# Patient Record
Sex: MALE | Race: WHITE | Employment: FULL TIME | URBAN - METROPOLITAN AREA
[De-identification: names, ages, dates, MRNs, and addresses within clinical notes are randomized per-mention and may not be internally consistent; named-entity substitution may affect disease eponyms.]

---

## 2020-06-30 ENCOUNTER — MEDICAL CORRESPONDENCE (OUTPATIENT)
Dept: HEALTH INFORMATION MANAGEMENT | Facility: CLINIC | Age: 65
End: 2020-06-30

## 2020-07-10 ENCOUNTER — TRANSFERRED RECORDS (OUTPATIENT)
Dept: HEALTH INFORMATION MANAGEMENT | Facility: CLINIC | Age: 65
End: 2020-07-10

## 2020-07-10 LAB
ALBUMIN SERPL-MCNC: 4.4 G/DL
ALP SERPL-CCNC: 130 U/L
ALT SERPL-CCNC: 25 U/L
ANION GAP SERPL CALCULATED.3IONS-SCNC: ABNORMAL MMOL/L
AST SERPL-CCNC: 18 U/L
BILIRUB SERPL-MCNC: 1 MG/DL
BUN SERPL-MCNC: 17 MG/DL
CALCIUM SERPL-MCNC: 9.4 MG/DL
CHLORIDE SERPLBLD-SCNC: 103 MMOL/L
CHOLEST SERPL-MCNC: 208 MG/DL (ref 100–199)
CO2 SERPL-SCNC: 27 MMOL/L
CREAT SERPL-MCNC: 0.78 MG/DL
ERYTHROCYTE [DISTWIDTH] IN BLOOD BY AUTOMATED COUNT: 13.3 %
GFR SERPL CREATININE-BSD FRML MDRD: 95 ML/MIN/1.73M2
GLUCOSE SERPL-MCNC: 105 MG/DL (ref 65–99)
HBA1C MFR BLD: 5.6 % (ref 0–5.6)
HCT VFR BLD AUTO: 45.8 %
HDLC SERPL-MCNC: 45 MG/DL
HEMOGLOBIN: 15.3 G/DL (ref 13.5–18)
LDLC SERPL CALC-MCNC: 145 MG/DL (ref 0–99)
MCH RBC QN AUTO: 31.7 PG
MCHC RBC AUTO-ENTMCNC: 33.3 G/DL
MCV RBC AUTO: 95.2 FL
PLATELET # BLD AUTO: 288 10^9/L
POTASSIUM SERPL-SCNC: 4.6 MMOL/L
PROT SERPL-MCNC: 6.3 G/DL
RBC # BLD AUTO: 4.81 10^12/L
SODIUM SERPL-SCNC: 141 MMOL/L
TRIGL SERPL-MCNC: 89 MG/DL (ref 0–149)
VLDL CHOLESTEROL: 18 (ref 5–40)
WBC # BLD AUTO: 5.8 10^9/L

## 2020-07-16 ENCOUNTER — OFFICE VISIT (OUTPATIENT)
Dept: SURGERY | Facility: CLINIC | Age: 65
End: 2020-07-16
Payer: COMMERCIAL

## 2020-07-16 VITALS
BODY MASS INDEX: 28.44 KG/M2 | WEIGHT: 210 LBS | SYSTOLIC BLOOD PRESSURE: 126 MMHG | DIASTOLIC BLOOD PRESSURE: 70 MMHG | HEIGHT: 72 IN | HEART RATE: 58 BPM

## 2020-07-16 DIAGNOSIS — K40.90 RIGHT INGUINAL HERNIA: ICD-10-CM

## 2020-07-16 PROCEDURE — 99204 OFFICE O/P NEW MOD 45 MIN: CPT | Performed by: SURGERY

## 2020-07-16 ASSESSMENT — MIFFLIN-ST. JEOR: SCORE: 1780.55

## 2020-07-16 NOTE — PROGRESS NOTES
[facility]         Rasheed Bennett MD, MD    Bull Mariee MRN# [id number]   YOB: 1955 Age: 65 year old     PCP:  No primary care provider on file. None    Chief Complaint:  Right inguinal hernia    History of Present Illness:  Bull Mariee is a 65 year old male who presented with a an intermittent bulge near the right groin. The bulge comes and goes but has gotten larger over time. It is uncomfortable when the patient is engaging in exertional activity.  He is an avid cyclist.  Has not noticed any bulging on the left side or at the umbilicus.  The patient is here to discuss possible surgical repair of the right inguinal hernia.    PMH:  Bull Mariee  has no past medical history on file.  PSH:  Bull Mariee  has no past surgical history on file.    Home medications and allergies reviewed.    Social History:  Bull Mariee  reports that he has never smoked. He has never used smokeless tobacco. He reports current alcohol use. He reports that he does not use drugs.  Family History:  Bull Mariee family history includes Chronic Obstructive Pulmonary Disease in his father; Colon Polyps in his mother; Emphysema in his father.    ROS:  The 10 point Review of Systems is negative other than noted in the HPI.    Physical Exam:  Blood pressure 126/70, pulse 58, height 1.829 m (6'), weight 95.3 kg (210 lb).  210 lbs 0 oz  Healthy gentleman in no distress.  Converses normally, affect unremarkable  Pupils equal round and reactive to light.  Moist mucous membranes, tongue midline   No cervical lymphadenopathy or thyromegaly.   Lung fields clear, breathing comfortably.   Heart normal sinus rhythm.  No murmurs rubs or gallops.  No obvious neurological deficits  Abdomen soft, nontender, nondistended.  Obvious bulge on the right.  No evidence of hernia on the left or at the umbilicus       Assessment/plan:  Pleasant healthy young male with what appears to be an inguinal  hernia. I explained that these are usually not a cause for small bowel incarceration or obstruction, but do become larger over time. They can certainly be uncomfortable and repair is warranted. I recommended a laparoscopic right, possible bilateral inguinal hernia repair with mesh. This would normally be done under a general anesthetic. Risks of surgery were explained to the patient, including hernia recurrence, wound infection, or mesh removal.  Patient was going to notify the office when they were ready to schedule surgery.    Jack Bennett M.D.  [facility]  [phone]    Please route or send letter to:  Primary Care Provider (PCP) and Referring Provider

## 2020-07-16 NOTE — LETTER
[facility]    [address], [address]  [place], [place] [address]  Phone [phone]  Fax [phone]    [address], [address]  [facility] [place] [address]  Phone [phone]  Fax [phone]    [email or web address]     July 16, 2020      [facility]         Rasheed Bennett MD, MD     Bull Mariee MRN# [id number]   YOB: 1955 Age: 65 year old      PCP:  No primary care provider on file. None     Chief Complaint:  Right inguinal hernia     History of Present Illness:  Bull Mariee is a 65 year old male who presented with a an intermittent bulge near the right groin. The bulge comes and goes but has gotten larger over time. It is uncomfortable when the patient is engaging in exertional activity.  He is an avid cyclist.  Has not noticed any bulging on the left side or at the umbilicus.  The patient is here to discuss possible surgical repair of the right inguinal hernia.     PMH:  Bull Mariee  has no past medical history on file.  PSH:  Bull Mariee  has no past surgical history on file.     Home medications and allergies reviewed.     Social History:  Bull Mariee  reports that he has never smoked. He has never used smokeless tobacco. He reports current alcohol use. He reports that he does not use drugs.  Family History:  Bull Mariee family history includes Chronic Obstructive Pulmonary Disease in his father; Colon Polyps in his mother; Emphysema in his father.     ROS:  The 10 point Review of Systems is negative other than noted in the HPI.     Physical Exam:  Blood pressure 126/70, pulse 58, height 1.829 m (6'), weight 95.3 kg (210 lb).  210 lbs 0 oz  Healthy gentleman in no distress.  Converses normally, affect unremarkable  Pupils equal round and reactive to light.  Moist mucous membranes, tongue midline   No cervical lymphadenopathy or thyromegaly.   Lung fields  clear, breathing comfortably.   Heart normal sinus rhythm.  No murmurs rubs or gallops.  No obvious neurological deficits  Abdomen soft, nontender, nondistended.  Obvious bulge on the right.  No evidence of hernia on the left or at the umbilicus         Assessment/plan:  Pleasant healthy young male with what appears to be an inguinal hernia. I explained that these are usually not a cause for small bowel incarceration or obstruction, but do become larger over time. They can certainly be uncomfortable and repair is warranted. I recommended a laparoscopic right, possible bilateral inguinal hernia repair with mesh. This would normally be done under a general anesthetic. Risks of surgery were explained to the patient, including hernia recurrence, wound infection, or mesh removal.  Patient was going to notify the office when they were ready to schedule surgery.     Jack Bennett M.D.  [facility]  [phone]

## 2020-07-20 ENCOUNTER — PREP FOR PROCEDURE (OUTPATIENT)
Dept: SURGERY | Facility: CLINIC | Age: 65
End: 2020-07-20

## 2020-07-20 DIAGNOSIS — K40.90 RIGHT INGUINAL HERNIA: Primary | ICD-10-CM

## 2020-07-20 DIAGNOSIS — Z11.59 ENCOUNTER FOR SCREENING FOR OTHER VIRAL DISEASES: Primary | ICD-10-CM

## 2020-07-22 ENCOUNTER — HOSPITAL ENCOUNTER (OUTPATIENT)
Dept: CARDIOLOGY | Facility: CLINIC | Age: 65
Discharge: HOME | End: 2020-07-22
Attending: PHYSICIAN ASSISTANT | Admitting: PHYSICIAN ASSISTANT
Payer: COMMERCIAL

## 2020-07-22 ENCOUNTER — TELEPHONE (OUTPATIENT)
Dept: SURGERY | Facility: CLINIC | Age: 65
End: 2020-07-22

## 2020-07-22 DIAGNOSIS — E78.5 HYPERLIPIDEMIA: ICD-10-CM

## 2020-07-22 PROCEDURE — 75571 CT HRT W/O DYE W/CA TEST: CPT | Mod: 26 | Performed by: INTERNAL MEDICINE

## 2020-07-22 PROCEDURE — 75571 CT HRT W/O DYE W/CA TEST: CPT

## 2020-07-22 NOTE — TELEPHONE ENCOUNTER
Type of surgery: Laparoscopic right inguinal hernia repair, possible bilateral repair  Location of surgery: [facility]  Date and time of surgery: 8/10/20 at 10am  Surgeon: Dr. Rasheed Bennett  Pre-Op Appt Date: Patient to schedule  Post-Op Appt Date: Patient to schedule   Packet sent out: Yes  Pre-cert/Authorization completed:  Not Applicable  Date: 7/22/20

## 2020-07-31 ENCOUNTER — TELEPHONE (OUTPATIENT)
Dept: SURGERY | Facility: CLINIC | Age: 65
End: 2020-07-31

## 2020-07-31 NOTE — TELEPHONE ENCOUNTER
"Name of caller: Patient    Reason for Call:  Scheduled for [facility] on 8/10 and would like to get information on the mesh Dr Bennett uses for the hernia surgery. \"Just doing my homework\". What precautions or potential risks accompany the product. Any recalls?Can I get a couple referrals to patients that have had a similar procedure with the use of the mesh.    Surgeon:  Dr. Bennett     Recent Surgery:  No    If yes, when & what type:  N/A      Best phone number to reach pt at is: [phone]  Ok to leave a message with medical info? Yes.    Pharmacy preferred (if calling for a refill): N/A    "

## 2020-08-06 DIAGNOSIS — Z11.59 ENCOUNTER FOR SCREENING FOR OTHER VIRAL DISEASES: ICD-10-CM

## 2020-08-06 PROCEDURE — U0003 INFECTIOUS AGENT DETECTION BY NUCLEIC ACID (DNA OR RNA); SEVERE ACUTE RESPIRATORY SYNDROME CORONAVIRUS 2 (SARS-COV-2) (CORONAVIRUS DISEASE [COVID-19]), AMPLIFIED PROBE TECHNIQUE, MAKING USE OF HIGH THROUGHPUT TECHNOLOGIES AS DESCRIBED BY CMS-2020-01-R: HCPCS | Performed by: SURGERY

## 2020-08-07 LAB
SARS-COV-2 RNA SPEC QL NAA+PROBE: NOT DETECTED
SPECIMEN SOURCE: NORMAL

## 2020-08-10 ENCOUNTER — ANESTHESIA (OUTPATIENT)
Dept: SURGERY | Facility: CLINIC | Age: 65
End: 2020-08-10
Payer: COMMERCIAL

## 2020-08-10 ENCOUNTER — ANESTHESIA EVENT (OUTPATIENT)
Dept: SURGERY | Facility: CLINIC | Age: 65
End: 2020-08-10
Payer: COMMERCIAL

## 2020-08-10 ENCOUNTER — HOSPITAL ENCOUNTER (OUTPATIENT)
Facility: CLINIC | Age: 65
Discharge: HOME | End: 2020-08-10
Attending: SURGERY | Admitting: SURGERY
Payer: COMMERCIAL

## 2020-08-10 ENCOUNTER — APPOINTMENT (OUTPATIENT)
Dept: SURGERY | Facility: PHYSICIAN GROUP | Age: 65
End: 2020-08-10
Payer: COMMERCIAL

## 2020-08-10 VITALS
OXYGEN SATURATION: 95 % | BODY MASS INDEX: 29.71 KG/M2 | HEIGHT: 71 IN | HEART RATE: 70 BPM | SYSTOLIC BLOOD PRESSURE: 113 MMHG | RESPIRATION RATE: 14 BRPM | TEMPERATURE: 96.6 F | DIASTOLIC BLOOD PRESSURE: 79 MMHG | WEIGHT: 212.2 LBS

## 2020-08-10 DIAGNOSIS — K40.90 RIGHT INGUINAL HERNIA: ICD-10-CM

## 2020-08-10 PROCEDURE — C1727 CATH, BAL TIS DIS, NON-VAS: HCPCS | Performed by: SURGERY

## 2020-08-10 PROCEDURE — 40000169 ZZH STATISTIC PRE-PROCEDURE ASSESSMENT I: Performed by: SURGERY

## 2020-08-10 PROCEDURE — C1781 MESH (IMPLANTABLE): HCPCS | Performed by: SURGERY

## 2020-08-10 PROCEDURE — 37000009 ZZH ANESTHESIA TECHNICAL FEE, EACH ADDTL 15 MIN: Performed by: SURGERY

## 2020-08-10 PROCEDURE — 25000132 ZZH RX MED GY IP 250 OP 250 PS 637

## 2020-08-10 PROCEDURE — 25000125 ZZHC RX 250: Performed by: NURSE ANESTHETIST, CERTIFIED REGISTERED

## 2020-08-10 PROCEDURE — 25800030 ZZH RX IP 258 OP 636: Performed by: NURSE ANESTHETIST, CERTIFIED REGISTERED

## 2020-08-10 PROCEDURE — 25000566 ZZH SEVOFLURANE, EA 15 MIN: Performed by: SURGERY

## 2020-08-10 PROCEDURE — 71000012 ZZH RECOVERY PHASE 1 LEVEL 1 FIRST HR: Performed by: SURGERY

## 2020-08-10 PROCEDURE — 36000058 ZZH SURGERY LEVEL 3 EA 15 ADDTL MIN: Performed by: SURGERY

## 2020-08-10 PROCEDURE — 25000128 H RX IP 250 OP 636: Performed by: NURSE ANESTHETIST, CERTIFIED REGISTERED

## 2020-08-10 PROCEDURE — 37000008 ZZH ANESTHESIA TECHNICAL FEE, 1ST 30 MIN: Performed by: SURGERY

## 2020-08-10 PROCEDURE — 25000125 ZZHC RX 250: Performed by: SURGERY

## 2020-08-10 PROCEDURE — 25000128 H RX IP 250 OP 636: Performed by: SURGERY

## 2020-08-10 PROCEDURE — 27210794 ZZH OR GENERAL SUPPLY STERILE: Performed by: SURGERY

## 2020-08-10 PROCEDURE — 71000027 ZZH RECOVERY PHASE 2 EACH 15 MINS: Performed by: SURGERY

## 2020-08-10 PROCEDURE — 36000056 ZZH SURGERY LEVEL 3 1ST 30 MIN: Performed by: SURGERY

## 2020-08-10 PROCEDURE — 71000013 ZZH RECOVERY PHASE 1 LEVEL 1 EA ADDTL HR: Performed by: SURGERY

## 2020-08-10 DEVICE — MESH PROGRIP LAPAROSCOPIC 5.9X3.9" PARIETEX SELF-FIX LPG1510: Type: IMPLANTABLE DEVICE | Site: GROIN | Status: FUNCTIONAL

## 2020-08-10 RX ORDER — CEFAZOLIN SODIUM 1 G/3ML
1 INJECTION, POWDER, FOR SOLUTION INTRAMUSCULAR; INTRAVENOUS SEE ADMIN INSTRUCTIONS
Status: DISCONTINUED | OUTPATIENT
Start: 2020-08-10 | End: 2020-08-10 | Stop reason: HOSPADM

## 2020-08-10 RX ORDER — FENTANYL CITRATE 50 UG/ML
INJECTION, SOLUTION INTRAMUSCULAR; INTRAVENOUS PRN
Status: DISCONTINUED | OUTPATIENT
Start: 2020-08-10 | End: 2020-08-10

## 2020-08-10 RX ORDER — SODIUM CHLORIDE, SODIUM LACTATE, POTASSIUM CHLORIDE, CALCIUM CHLORIDE 600; 310; 30; 20 MG/100ML; MG/100ML; MG/100ML; MG/100ML
INJECTION, SOLUTION INTRAVENOUS CONTINUOUS
Status: DISCONTINUED | OUTPATIENT
Start: 2020-08-10 | End: 2020-08-10 | Stop reason: HOSPADM

## 2020-08-10 RX ORDER — ONDANSETRON 4 MG/1
4 TABLET, ORALLY DISINTEGRATING ORAL EVERY 30 MIN PRN
Status: DISCONTINUED | OUTPATIENT
Start: 2020-08-10 | End: 2020-08-10 | Stop reason: HOSPADM

## 2020-08-10 RX ORDER — NALOXONE HYDROCHLORIDE 0.4 MG/ML
.1-.4 INJECTION, SOLUTION INTRAMUSCULAR; INTRAVENOUS; SUBCUTANEOUS
Status: DISCONTINUED | OUTPATIENT
Start: 2020-08-10 | End: 2020-08-10 | Stop reason: HOSPADM

## 2020-08-10 RX ORDER — LIDOCAINE HYDROCHLORIDE 20 MG/ML
INJECTION, SOLUTION INFILTRATION; PERINEURAL PRN
Status: DISCONTINUED | OUTPATIENT
Start: 2020-08-10 | End: 2020-08-10

## 2020-08-10 RX ORDER — ONDANSETRON 2 MG/ML
4 INJECTION INTRAMUSCULAR; INTRAVENOUS EVERY 30 MIN PRN
Status: DISCONTINUED | OUTPATIENT
Start: 2020-08-10 | End: 2020-08-10 | Stop reason: HOSPADM

## 2020-08-10 RX ORDER — BUPIVACAINE HYDROCHLORIDE AND EPINEPHRINE 5; 5 MG/ML; UG/ML
INJECTION, SOLUTION EPIDURAL; INTRACAUDAL; PERINEURAL
Status: DISCONTINUED
Start: 2020-08-10 | End: 2020-08-10 | Stop reason: HOSPADM

## 2020-08-10 RX ORDER — LIDOCAINE HYDROCHLORIDE 10 MG/ML
INJECTION, SOLUTION EPIDURAL; INFILTRATION; INTRACAUDAL; PERINEURAL
Status: DISCONTINUED
Start: 2020-08-10 | End: 2020-08-10 | Stop reason: HOSPADM

## 2020-08-10 RX ORDER — HYDROCODONE BITARTRATE AND ACETAMINOPHEN 5; 325 MG/1; MG/1
2 TABLET ORAL
Status: COMPLETED | OUTPATIENT
Start: 2020-08-10 | End: 2020-08-10

## 2020-08-10 RX ORDER — HYDROMORPHONE HYDROCHLORIDE 1 MG/ML
.3-.5 INJECTION, SOLUTION INTRAMUSCULAR; INTRAVENOUS; SUBCUTANEOUS EVERY 5 MIN PRN
Status: DISCONTINUED | OUTPATIENT
Start: 2020-08-10 | End: 2020-08-10 | Stop reason: HOSPADM

## 2020-08-10 RX ORDER — PROPOFOL 10 MG/ML
INJECTION, EMULSION INTRAVENOUS PRN
Status: DISCONTINUED | OUTPATIENT
Start: 2020-08-10 | End: 2020-08-10

## 2020-08-10 RX ORDER — EPHEDRINE SULFATE 50 MG/ML
INJECTION, SOLUTION INTRAMUSCULAR; INTRAVENOUS; SUBCUTANEOUS PRN
Status: DISCONTINUED | OUTPATIENT
Start: 2020-08-10 | End: 2020-08-10

## 2020-08-10 RX ORDER — NEOSTIGMINE METHYLSULFATE 1 MG/ML
VIAL (ML) INJECTION PRN
Status: DISCONTINUED | OUTPATIENT
Start: 2020-08-10 | End: 2020-08-10

## 2020-08-10 RX ORDER — ONDANSETRON 2 MG/ML
INJECTION INTRAMUSCULAR; INTRAVENOUS PRN
Status: DISCONTINUED | OUTPATIENT
Start: 2020-08-10 | End: 2020-08-10

## 2020-08-10 RX ORDER — CEFAZOLIN SODIUM 2 G/100ML
2 INJECTION, SOLUTION INTRAVENOUS
Status: COMPLETED | OUTPATIENT
Start: 2020-08-10 | End: 2020-08-10

## 2020-08-10 RX ORDER — OXYCODONE AND ACETAMINOPHEN 5; 325 MG/1; MG/1
1-2 TABLET ORAL EVERY 4 HOURS PRN
Qty: 12 TABLET | Refills: 0 | Status: SHIPPED | OUTPATIENT
Start: 2020-08-10 | End: 2020-09-23

## 2020-08-10 RX ORDER — GLYCOPYRROLATE 0.2 MG/ML
INJECTION, SOLUTION INTRAMUSCULAR; INTRAVENOUS PRN
Status: DISCONTINUED | OUTPATIENT
Start: 2020-08-10 | End: 2020-08-10

## 2020-08-10 RX ORDER — DEXAMETHASONE SODIUM PHOSPHATE 4 MG/ML
INJECTION, SOLUTION INTRA-ARTICULAR; INTRALESIONAL; INTRAMUSCULAR; INTRAVENOUS; SOFT TISSUE PRN
Status: DISCONTINUED | OUTPATIENT
Start: 2020-08-10 | End: 2020-08-10

## 2020-08-10 RX ORDER — FENTANYL CITRATE 0.05 MG/ML
25-50 INJECTION, SOLUTION INTRAMUSCULAR; INTRAVENOUS
Status: DISCONTINUED | OUTPATIENT
Start: 2020-08-10 | End: 2020-08-10 | Stop reason: HOSPADM

## 2020-08-10 RX ORDER — AMOXICILLIN 250 MG
1-2 CAPSULE ORAL 2 TIMES DAILY
Qty: 30 TABLET | Refills: 0 | Status: SHIPPED | OUTPATIENT
Start: 2020-08-10 | End: 2020-09-23

## 2020-08-10 RX ORDER — SODIUM CHLORIDE, SODIUM LACTATE, POTASSIUM CHLORIDE, CALCIUM CHLORIDE 600; 310; 30; 20 MG/100ML; MG/100ML; MG/100ML; MG/100ML
INJECTION, SOLUTION INTRAVENOUS CONTINUOUS PRN
Status: DISCONTINUED | OUTPATIENT
Start: 2020-08-10 | End: 2020-08-10

## 2020-08-10 RX ORDER — KETOROLAC TROMETHAMINE 30 MG/ML
INJECTION, SOLUTION INTRAMUSCULAR; INTRAVENOUS PRN
Status: DISCONTINUED | OUTPATIENT
Start: 2020-08-10 | End: 2020-08-10

## 2020-08-10 RX ADMIN — GLYCOPYRROLATE 0.8 MG: 0.2 INJECTION, SOLUTION INTRAMUSCULAR; INTRAVENOUS at 11:15

## 2020-08-10 RX ADMIN — GLYCOPYRROLATE 0.2 MG: 0.2 INJECTION, SOLUTION INTRAMUSCULAR; INTRAVENOUS at 10:37

## 2020-08-10 RX ADMIN — LIDOCAINE HYDROCHLORIDE 60 MG: 20 INJECTION, SOLUTION INFILTRATION; PERINEURAL at 10:16

## 2020-08-10 RX ADMIN — PROPOFOL 200 MG: 10 INJECTION, EMULSION INTRAVENOUS at 10:16

## 2020-08-10 RX ADMIN — KETOROLAC TROMETHAMINE 30 MG: 30 INJECTION, SOLUTION INTRAMUSCULAR at 11:23

## 2020-08-10 RX ADMIN — NEOSTIGMINE METHYLSULFATE 5 MG: 1 INJECTION, SOLUTION INTRAVENOUS at 11:15

## 2020-08-10 RX ADMIN — DEXMEDETOMIDINE HYDROCHLORIDE 8 MCG: 100 INJECTION, SOLUTION INTRAVENOUS at 11:04

## 2020-08-10 RX ADMIN — PHENYLEPHRINE HYDROCHLORIDE 100 MCG: 10 INJECTION INTRAVENOUS at 10:20

## 2020-08-10 RX ADMIN — DEXMEDETOMIDINE HYDROCHLORIDE 12 MCG: 100 INJECTION, SOLUTION INTRAVENOUS at 11:15

## 2020-08-10 RX ADMIN — HYDROMORPHONE HYDROCHLORIDE 0.5 MG: 1 INJECTION, SOLUTION INTRAMUSCULAR; INTRAVENOUS; SUBCUTANEOUS at 10:42

## 2020-08-10 RX ADMIN — FENTANYL CITRATE 100 MCG: 50 INJECTION, SOLUTION INTRAMUSCULAR; INTRAVENOUS at 10:16

## 2020-08-10 RX ADMIN — DEXMEDETOMIDINE HYDROCHLORIDE 8 MCG: 100 INJECTION, SOLUTION INTRAVENOUS at 10:41

## 2020-08-10 RX ADMIN — Medication 10 MG: at 10:20

## 2020-08-10 RX ADMIN — DEXMEDETOMIDINE HYDROCHLORIDE 12 MCG: 100 INJECTION, SOLUTION INTRAVENOUS at 10:48

## 2020-08-10 RX ADMIN — DEXAMETHASONE SODIUM PHOSPHATE 4 MG: 4 INJECTION, SOLUTION INTRA-ARTICULAR; INTRALESIONAL; INTRAMUSCULAR; INTRAVENOUS; SOFT TISSUE at 10:26

## 2020-08-10 RX ADMIN — MIDAZOLAM 2 MG: 1 INJECTION INTRAMUSCULAR; INTRAVENOUS at 10:08

## 2020-08-10 RX ADMIN — SODIUM CHLORIDE, POTASSIUM CHLORIDE, SODIUM LACTATE AND CALCIUM CHLORIDE: 600; 310; 30; 20 INJECTION, SOLUTION INTRAVENOUS at 10:08

## 2020-08-10 RX ADMIN — Medication 10 MG: at 10:58

## 2020-08-10 RX ADMIN — CEFAZOLIN SODIUM 2 G: 2 INJECTION, SOLUTION INTRAVENOUS at 10:14

## 2020-08-10 RX ADMIN — HYDROCODONE BITARTRATE AND ACETAMINOPHEN 1 TABLET: 5; 325 TABLET ORAL at 12:22

## 2020-08-10 RX ADMIN — FENTANYL CITRATE 25 MCG: 50 INJECTION, SOLUTION INTRAMUSCULAR; INTRAVENOUS at 11:17

## 2020-08-10 RX ADMIN — ONDANSETRON 4 MG: 2 INJECTION INTRAMUSCULAR; INTRAVENOUS at 11:26

## 2020-08-10 RX ADMIN — ROCURONIUM BROMIDE 50 MG: 10 INJECTION INTRAVENOUS at 10:16

## 2020-08-10 ASSESSMENT — MIFFLIN-ST. JEOR: SCORE: 1774.66

## 2020-08-10 NOTE — DISCHARGE INSTRUCTIONS
Same Day Surgery Discharge Instructions for  Sedation and General Anesthesia       It's not unusual to feel dizzy, light-headed or faint for up to 24 hours after surgery or while taking pain medication.  If you have these symptoms: sit for a few minutes before standing and have someone assist you when you get up to walk or use the bathroom.      You should rest and relax for the next 24 hours. We recommend you make arrangements to have an adult stay with you for at least 24 hours after your discharge.  Avoid hazardous and strenuous activity.      DO NOT DRIVE any vehicle or operate mechanical equipment for 24 hours following the end of your surgery.  Even though you may feel normal, your reactions may be affected by the medication you have received.      Do not drink alcoholic beverages for 24 hours following surgery.       Slowly progress to your regular diet as you feel able. It's not unusual to feel nauseated and/or vomit after receiving anesthesia.  If you develop these symptoms, drink clear liquids (apple juice, ginger ale, broth, 7-up, etc. ) until you feel better.  If your nausea and vomiting persists for 24 hours, please notify your surgeon.        All narcotic pain medications, along with inactivity and anesthesia, can cause constipation. Drinking plenty of liquids and increasing fiber intake will help.      For any questions of a medical nature, call your surgeon.      Do not make important decisions for 24 hours.      If you had general anesthesia, you may have a sore throat for a couple of days related to the breathing tube used during surgery.  You may use Cepacol lozenges to help with this discomfort.  If it worsens or if you develop a fever, contact your surgeon.       If you feel your pain is not well managed with the pain medications prescribed by your surgeon, please contact your surgeon's office to let them know so they can address your concerns.       CoVid 19 Information    We want to give you  information regarding Covid. Please consult your primary care provider with any questions you might have.     Patient who have symptoms (cough, fever, or shortness of breath), need to isolate for 7 days from when symptoms started OR 72 hours after fever resolves (without fever reducing medications) AND improvement of respiratory symptoms (whichever is longer).      Isolate yourself at home (in own room/own bathroom if possible)    Do Not allow any visitors    Do Not go to work or school    Do Not go to [employer],  centers, shopping, or other public places.    Do Not shake hands.    Avoid close and intimate contact with others (hugging, kissing).    Follow CDC recommendations for household cleaning of frequently touched services.     After the initial 7 days, continue to isolate yourself from household members as much as possible. To continue decrease the risk of community spread and exposure, you and any members of your household should limit activities in public for 14 days after starting home isolation.     You can reference the following CDC link for helpful home isolation/care tips:  https://www.cdc.gov/coronavirus/2019-ncov/downloads/10Things.pdf    Protect Others:    Cover Your Mouth and Nose with a mask, disposable tissue or wash cloth to avoid spreading germs to others.    Wash your hands and face frequently with soap and water    Call Your Primary Doctor If: Breathing difficulty develops or you become worse.    For more information about COVID19 and options for caring for yourself at home, please visit the CDC website at https://www.cdc.gov/coronavirus/2019-ncov/about/steps-when-sick.html  For more options for care at [facility], please visit our website at [email or web address]          Today you received Toradol, an antiinflammatory medication similar to Ibuprofen.  You should not take other antiinflammatory medication, such as Ibuprofen, Motrin, Advil, Aleve,  Naprosyn, etc until 5:30 p.m.           [facility]  Discharge Instructions: Post-Operative Laparoscopic Inguinal Hernia    ACTIVITY    Take frequent, short walks and increase your activity gradually.      Avoid strenuous physical activity or heavy lifting greater than 15lbs. for 3-4 weeks.  You may climb stairs.    You may drive without restrictions when you are not using any prescription pain medication and comfortable in a car.    You may return to work/school when you are comfortable without any prescription pain medication.    WOUND CARE    You may remove your outer dressing or Band-Aids and shower 48 hours after the surgery.  Pat your incisions dry and leave open to air.  Re-apply dressing (Band-aid or gauze/tape) as needed for comfort or drainage.    You may have steri-strips (looks like white tape) on your incision.  You may peel off the steri-strip 2 weeks after surgery if they have not peeled off on their own.    Do not soak your incisions in a tub or pool for 2 weeks.     Do not apply any lotions, creams, or ointments to your incisions.    A ridge under incisions is normal and will gradually resolve.    DIET    Start with liquids, then gradually resume your regular diet as tolerated.     Drink plenty of liquids to stay hydrated.     PAIN    Expect some tenderness and discomfort at the incision sites.  Use the prescribed pain medication at your discretion.  Expect gradual resolution of your pain over several days.    You may take ibuprofen with food (unless you have been told not to) instead of or in addition to your prescribed pain medication.  If you are taking Norco or Percocet, do not take any additional acetaminophen/APAP/Tylenol.    Do not drink alcohol or drive while you are taking pain medications.    You may apply ice to your incisions in 20 minute intervals as needed for the next 48 hours.  After that time, consider switching to heat if you  prefer.    EXPECTATIONS    *For our male patients, you may notice air in your scrotum and/or penis after the surgery.  This is due to the gas used during surgery.  You can expect some swelling and bruising involving the scrotum and/or penis as well as numbness.  These symptoms are expected and should gradually resolve in the next few days. You may use ice to help with the swelling and- try placing a rolled hand towel below your scrotum to help alleviate scrotal discomfort.  If you develop significant testicular or penile pain, please call our office and speak with a nurse.     Pain medications can cause constipation.  Limit use when possible.  Take over the counter stool softener/stimulant, such as Colace or Senna, 1-2 times a day with plenty of water.  You may take a mild over the counter laxative, such as Miralax or a suppository, as needed.  You may discontinue these medications once you are having regular bowel movements and/or are no longer taking your narcotic pain medication.      You may have shoulder or upper back discomfort due to the gas used in surgery.  This is temporary and should resolve in 48-72 hours.  Short, frequent walks may help with this.      FOLLOW UP    Our office will contact you approximately 2 weeks to check on your progress and answer any questions you may have.  If you are doing well, you will not need to return for a follow up appointment.  If any concerns are identified over the phone, we will help you make an appointment to see a provider.    If you have not received a phone call, have any questions or concerns, or would like to be seen, please call us at [phone] and ask to speak with our nurse.  We are located at [address] [place] [address].    CALL OUR OFFICE IF YOU HAVE:     Chills or fever above 101.5 F.    Increased redness or drainage at your incisions.    Significant bleeding.    Pain not relieved by your pain medication or rest.    Increasing pain  after the first 48 hours.    Any other concerns or questions.      Revised January 2018

## 2020-08-10 NOTE — ANESTHESIA POSTPROCEDURE EVALUATION
Patient: Bull Mariee    Procedure(s):  LAPAROSCOPIC RIGHT INGUINAL HERNIA REPAIR,    Diagnosis:Right inguinal hernia [K40.90]  Diagnosis Additional Information: No value filed.    Anesthesia Type:  General    Note:  Anesthesia Post Evaluation    Patient location during evaluation: bedside  Patient participation: Able to fully participate in evaluation  Level of consciousness: awake and alert  Pain management: adequate  Airway patency: patent  Cardiovascular status: acceptable  Respiratory status: acceptable  Hydration status: acceptable  PONV: none and controlled     Anesthetic complications: None          Last vitals:  Vitals:    08/10/20 1215 08/10/20 1230 08/10/20 1330   BP: 122/75 128/84 113/79   Pulse:  70    Resp: 14 12 14   Temp:      SpO2: 95% 92% 95%         Electronically Signed By: Howie Crain MD  August 10, 2020  4:50 PM

## 2020-08-10 NOTE — ANESTHESIA PREPROCEDURE EVALUATION
Procedure: Procedure(s):  LAPAROSCOPIC RIGHT INGUINAL HERNIA REPAIR, POSSIBLE BILATERAL  Preop diagnosis: Right inguinal hernia [K40.90]    No Known Allergies  Past Medical History:   Diagnosis Date     Dyslipidemia      ED (erectile dysfunction)      Impaired fasting glucose      Inguinal hernia      Past Surgical History:   Procedure Laterality Date     GENITOURINARY SURGERY      Vasectomy     Social History     Tobacco Use     Smoking status: Never Smoker     Smokeless tobacco: Never Used   Substance Use Topics     Alcohol use: Yes     Comment: 4 per week      Prior to Admission medications    Not on File     Current Facility-Administered Medications Ordered in Epic   Medication Dose Route Frequency Last Rate Last Dose     ceFAZolin (ANCEF) 1 g vial to attach to  ml bag for ADULT or 50 ml bag for PEDS  1 g Intravenous See Admin Instructions         ceFAZolin (ANCEF) intermittent infusion 2 g in 100 mL dextrose PRE-MIX  2 g Intravenous Pre-Op/Pre-procedure x 1 dose         No current [facility]-ordered outpatient medications on file.       Wt Readings from Last 1 Encounters:   08/10/20 96.3 kg (212 lb 3.2 oz)     Temp Readings from Last 1 Encounters:   08/10/20 36.1  C (97  F) (Oral)     BP Readings from Last 6 Encounters:   08/10/20 (!) 148/78   07/16/20 126/70     Pulse Readings from Last 4 Encounters:   08/10/20 58   07/16/20 58     Resp Readings from Last 1 Encounters:   08/10/20 18     SpO2 Readings from Last 1 Encounters:   08/10/20 96%     No results for input(s): NA, POTASSIUM, CHLORIDE, CO2, ANIONGAP, GLC, BUN, CR, TAISHA in the last 90683 hours.  No results for input(s): AST, ALT, ALKPHOS, BILITOTAL, LIPASE in the last 02369 hours.  No results for input(s): WBC, HGB, PLT in the last 22186 hours.  No results for input(s): ABO, RH in the last 96255 hours.  No results for input(s): INR, PTT in the last 78800 hours.   No results for input(s): TROPI in the last 86109 hours.  No results for input(s): PH, PCO2,  PO2, HCO3 in the last 46678 hours.  No results for input(s): HCG in the last 03752 hours.  Recent Results (from the past 744 hour(s))   CT Coronary Calcium Scan    Narrative    Procedure: CT CALCIUM SCREENING     Examination Date: 7/22/2020 4:56 PM      Clinical Information: Hyperlipidemia     Ordering Provider: Samantha Howell    PROCEDURE: High-resolution, ECG synchronized multi-slice computed  tomography was performed without incident. Coronary calcification was  analyzed using [employer] calcium scoring software. Scan protocol was  optimized to minimize radiation exposure. The total radiation exposure  was calculated to be 38 DLP and 0.53 mSv.    FINDINGS:    Overall quality of the study: Good.     CORONARY ARTERY CALCIUM SCORES:     Left main coronary artery: 76.56  Left anterior descending coronary artery: 267.90  Circumflex coronary artery: 11.82  Right coronary artery: 53.08    TOTAL CALCIUM SCORE: 405.36    The total Agatston calcium score is 405.36 placing the patient in the  80th percentile when compared to age and gender matched control group.    Please review Radiology report for incidental noncardiac findings that  will follow separately    CALCIUM SCORE OF >400: If the patient has more than one cardiac risk  factor then the risk factor ((male age >45, female age >55, diabetes,  hypertension, smoking, high cholesterol, low HDL, family history of  heart disease) of coronary heart disease, death or myocardial  infarction is 2.4% per year (Ximena P. et al. JAC, 2007;  49:378-402.) This represents a high and significant coronary  atherosclerotic plaque burden and is consistent with a high risk of  the presence of clinically significant coronary artery disease.  Because of the risk for the presence of obstructive coronary disease,  the patient should consider further imaging studies or cardiac stress  test at the discretion of the physician. Alternatively, referral could  be made to a cardiologist for  further evaluation. Consider starting  aspirin in doses between 81 and 325 mg if there is no  contraindication. If there are any cardiac symptoms (for example such  as chest pain/pressure or shortness of breath) then immediate medical  attention is necessary.    GENERAL RECOMMENDATIONS: Adoption and maintenance of a healthy  lifestyle is recommended for all people. This should include regular,  appropriate exercise and observance of a proper diet, to ensure  balanced nutrition and weight control. Tobacco use should be avoided.  Cholesterol has been linked to coronary atherosclerosis, and we  strongly encourage adhering to the recommendations of the National  Cholesterol Education Panel (NCEP) in this regard. For primary  prevention, these include a target goal for total cholesterol of less  than 200 mg/dl, HDL cholesterol of greater than 40 mg/dl,  triglycerides of less than 150 mg/dl, and LDL cholesterol of less than  70 mg/dl. However note that these are general recommendations only,  and as with all such matters, the personal physician should be  consulted regarding recommendations appropriate for the individual.        BERTIN RAM MD   Radiologist Consult For Cardiology    Narrative    RADIOLOGIST CONSULT FOR CARDIOLOGY 7/22/2020 4:56 PM    CLINICAL HISTORY: Hyperlipidemia.    TECHNIQUE: Limited CT chest without IV contrast.  Technique and  protocol were ordered per the cardiology service. Dose reduction  techniques were used.    CONTRAST: None.    COMPARISON: None.    FINDINGS: This is radiology assessment of non-cardiology findings.  Please refer to cardiology report for additional cardiac and vascular  findings.    LUNGS AND PLEURA: No acute abnormality. Posterior subpleural right  lower lobe nodule is 0.4 cm, series 5 image 45. Calcified granuloma  right middle lobe.    MEDIASTINUM/AXILLAE: No lymphadenopathy. No significant abnormality.    UPPER ABDOMEN: No significant finding.    MUSCULOSKELETAL:  Unremarkable.      Impression    IMPRESSION:   1.  Small indeterminate right lower lobe pulmonary nodule, see below  for follow up imaging guidelines.  2.  See cardiology report regarding cardiac and vascular findings.    RAFY ANDERSON MD       RECENT LABS:   ECG:   ECHO:   Anesthesia Pre-Procedure Evaluation    Patient: Bull Mariee   MRN: [id number] : 1955          Preoperative Diagnosis: Right inguinal hernia [K40.90]    Procedure(s):  LAPAROSCOPIC RIGHT INGUINAL HERNIA REPAIR, POSSIBLE BILATERAL    Past Medical History:   Diagnosis Date     Dyslipidemia      ED (erectile dysfunction)      Impaired fasting glucose      Inguinal hernia      Past Surgical History:   Procedure Laterality Date     GENITOURINARY SURGERY      Vasectomy       Anesthesia Evaluation     . Pt has had prior anesthetic.     No history of anesthetic complications          ROS/MED HX    ENT/Pulmonary:  - neg pulmonary ROS     Neurologic:  - neg neurologic ROS     Cardiovascular:     (+) Dyslipidemia, ----. : . . . :. .       METS/Exercise Tolerance:     Hematologic:         Musculoskeletal:         GI/Hepatic: Comment: Inguinal hernias        Renal/Genitourinary:         Endo:         Psychiatric:         Infectious Disease:         Malignancy:         Other:                          Physical Exam  Normal systems: cardiovascular and pulmonary    Airway   Mallampati: I  Neck ROM: full    Dental   (+) caps and missing    Cardiovascular       Pulmonary             No results found for: WBC, HGB, HCT, PLT, CRP, SED, NA, POTASSIUM, CHLORIDE, CO2, BUN, CR, GLC, TAISHA, PHOS, MAG, ALBUMIN, PROTTOTAL, ALT, AST, GGT, ALKPHOS, BILITOTAL, BILIDIRECT, LIPASE, AMYLASE, ANTHONY, PTT, INR, FIBR, TSH, T4, T3, HCG, HCGS, CKTOTAL, CKMB, TROPN    Preop Vitals  BP Readings from Last 3 Encounters:   08/10/20 (!) 148/78   20 126/70    Pulse Readings from Last 3 Encounters:   08/10/20 58   20 58      Resp Readings from Last 3 Encounters:   08/10/20 18  "   SpO2 Readings from Last 3 Encounters:   08/10/20 96%      Temp Readings from Last 1 Encounters:   08/10/20 36.1  C (97  F) (Oral)    Ht Readings from Last 1 Encounters:   08/10/20 1.803 m (5' 11\")      Wt Readings from Last 1 Encounters:   08/10/20 96.3 kg (212 lb 3.2 oz)    Estimated body mass index is 29.6 kg/m  as calculated from the following:    Height as of this encounter: 1.803 m (5' 11\").    Weight as of this encounter: 96.3 kg (212 lb 3.2 oz).       Anesthesia Plan      History & Physical Review  History and physical reviewed and following examination; no interval change.    ASA Status:  2 .    NPO Status:  > 8 hours    Plan for General with Intravenous induction. Maintenance will be Balanced.    PONV prophylaxis:  Ondansetron (or other 5HT-3)  Additional equipment: Videolaryngoscope        Postoperative Care  Postoperative pain management:  IV analgesics.      Consents  Anesthetic plan, risks, benefits and alternatives discussed with:  Patient..                 Howie Crain MD  "

## 2020-08-10 NOTE — BRIEF OP NOTE
[facility]    Brief Operative Note    Pre-operative diagnosis: Right inguinal hernia [K40.90]  Post-operative diagnosis: Same as pre-operative diagnosis    Procedure(s):  LAPAROSCOPIC RIGHT INGUINAL HERNIA REPAIR,     Surgeon(s) and Role:     * Rasheed Bennett MD - Primary     * Chano Coello MD     Anesthesia: General     Estimated blood loss: 5 mL    Drains: None    Specimens: * No specimens in log *    Findings: Right indirect hernia reduced and progrip mesh placed.      Complications: None    Implants:   Implant Name Type Inv. Item Serial No.  Lot No. LRB No. Used Action   MESH PROGRIP LAPAROSCOPIC 5.9X3.9&quot; PARIETEX SELF-FIX [id number] Mesh MESH PROGRIP LAPAROSCOPIC 5.9X3.9&quot; PARIETEX SELF-FIX [id number]  [facility] [id number] Right 1 Implanted     Chano Coello MD  General Surgery Resident, PGY-4  [phone]

## 2020-08-10 NOTE — OP NOTE
General Surgery Operative Note    PREOPERATIVE DIAGNOSIS:  Right inguinal hernia [K40.90]    POSTOPERATIVE DIAGNOSIS: Same    PROCEDURE:  LAPAROSCOPIC RIGHT INGUINAL HERNIA REPAIR    ANESTHESIA:  General.    PREOPERATIVE MEDICATIONS: Ancef    SURGEON:  Rasheed Bennett MD    ASSISTANT:   Chano Coello MD  A first assistant was necessary owing to challenging laparoscopic visualization and exposure.    INDICATIONS:  Symptomatic right inguinal Hernia    DESCRIPTION OF PROCEDURE: The patient was taken to the operating suite and uneventfully endotracheally intubated. The abdomen and groin were prepped and draped in a sterile fashion. Surgeon initiated timeout was acknowledged. We made a curvilinear incision at the underside of the umbilicus and took this down through skin and subcutaneous tissue. We dissected down until the anterior rectus sheath was encountered. We incised along the fascia such that we were looking at the rectus muscle directly. The rectus muscle was retracted laterally and we inserted our dissecting balloon along the posterior rectus sheath toward the pubic bone. Once this was in place, we removed the obturator and inserted our camera. We then instilled air into the dissecting balloon and under direct visualization created our preperitoneal space. Dissecting balloon was then removed and our working balloon was placed and positioned. Two 5 mm trocars were placed along the lower midline under direct laparoscopic visualization. We began our dissection on the right side. Using combination of sharp and blunt dissection, we created a plane behind the abdominal wall and the underlying peritoneum. This was done in a blunt and atraumatic fashion. The inferior epigastric vessels were visualized running along the underside of the abdominal wall.  We followed the epigastric vessels down until we were able to identify the internal ring. Along this ring there was obvious fatty attachments and a persistent hernia sac.  We began dissecting out the spermatic cord and we were able to easily identify the vas deferens and spermatic vessels. Once these were teased out, we identified the hernia sac and dissected this down and back such that it was well away from the orifice of the internal ring. We continued our dissection until we had an excellent space in the preperitoneal area. We then placed a Progrip mesh within this space.   The mesh was held in excellent position under direct visualization until the insufflation was completely out of the preperitoneal space. We then removed the 5 mm working ports under direct visualization. We removed the working balloon and identified the fascial edges of our trocar site. The fascia was then closed with a running 0 Vicryl suture. The wound was then irrigated and all skin edges were reapproximated with 4-0 Vicryl and Steri-Strips. At the conclusion of the case, all lap and needle counts were correct.     ESTIMATED BLOOD LOSS: 5 mL    INTRAOPERATIVE FINDINGS: Large indirect right inguinal hernia    Rasheed Bennett MD, MD

## 2020-08-20 ENCOUNTER — DOCUMENTATION ONLY (OUTPATIENT)
Dept: CARDIOLOGY | Facility: CLINIC | Age: 65
End: 2020-08-20

## 2020-08-24 ENCOUNTER — TELEPHONE (OUTPATIENT)
Dept: SURGERY | Facility: CLINIC | Age: 65
End: 2020-08-24

## 2020-08-24 NOTE — TELEPHONE ENCOUNTER
[facility]  Post op call note - Laparoscopic Inguinal Hernia Repair    Bull Mariee was called for an update regarding his recovery.  He underwent a laparoscopic right inguinal hernia repair by Dr. Bennett on 8/10/20.  Call went to voicemail and a message was left. A second call also went to voicemail.    Chano Coello MD  General Surgery Resident, PGY-4

## 2020-09-16 ENCOUNTER — PRE VISIT (OUTPATIENT)
Dept: CARDIOLOGY | Facility: CLINIC | Age: 65
End: 2020-09-16

## 2020-09-22 ENCOUNTER — TELEPHONE (OUTPATIENT)
Dept: CARDIOLOGY | Facility: CLINIC | Age: 65
End: 2020-09-22

## 2020-09-23 ENCOUNTER — OFFICE VISIT (OUTPATIENT)
Dept: CARDIOLOGY | Facility: CLINIC | Age: 65
End: 2020-09-23
Payer: COMMERCIAL

## 2020-09-23 VITALS
OXYGEN SATURATION: 97 % | DIASTOLIC BLOOD PRESSURE: 79 MMHG | HEIGHT: 72 IN | WEIGHT: 212.4 LBS | HEART RATE: 61 BPM | BODY MASS INDEX: 28.77 KG/M2 | SYSTOLIC BLOOD PRESSURE: 137 MMHG

## 2020-09-23 DIAGNOSIS — E78.2 MIXED HYPERLIPIDEMIA: ICD-10-CM

## 2020-09-23 DIAGNOSIS — R93.1 AGATSTON CORONARY ARTERY CALCIUM SCORE GREATER THAN 400: Primary | ICD-10-CM

## 2020-09-23 PROCEDURE — 99215 OFFICE O/P EST HI 40 MIN: CPT | Mod: 24 | Performed by: INTERNAL MEDICINE

## 2020-09-23 ASSESSMENT — MIFFLIN-ST. JEOR: SCORE: 1791.44

## 2020-09-23 NOTE — LETTER
9/23/2020    Luis Alberto Villavicencio MD  [address] [facility] [place] [address]  [facility] [facility] [address]    RE: Bull Mariee       Dear Colleague,    I had the pleasure of seeing Bull Mariee in the [facility].    CARDIOLOGY CLINIC CONSULTATION    REASON FOR CONSULT: Abnormal coronary calcium score    PRIMARY CARE PHYSICIAN:  Luis Alberto Villavicencio      HISTORY OF PRESENT ILLNESS:  Patient is a very pleasant 65-year-old male with past medical history of mild, diet-controlled hyperlipidemia, who presents as a new patient to discuss his elevated coronary calcium score.  Patient states that his normal primary care doctor has been out recently on the  tour of duty, and he saw one of his normal primary's partners.  This physician suggested ordering a coronary calcium score to help decide on whether or not he should be started on a statin.  The coronary calcium score came back as 405, which is markedly elevated.  The patient wished to discuss these results with a cardiologist, which is why he presents today.  We did discuss any cardiac symptoms that he may be having, but he denies any chest pain, shortness of breath, palpitations, lower extremity swelling, or other cardiac complaints.  He exercises regularly, biking 100 to 150 miles per week, without any recent drop in his exertional capacity.  He is not interested currently in taking a statin medication, but is interested in learning more about the pros and cons of this.    PAST MEDICAL HISTORY:  I have reviewed this patient's past medical history and updated it with pertinent information if needed.  Past Medical History:   Diagnosis Date     Dyslipidemia      ED (erectile dysfunction)      Elevated coronary artery calcium score     CT calcium score 7/22/2020: total Pznskyci=918 / 80th percentile     Impaired fasting glucose      Inguinal hernia        MEDICATIONS:  No current outpatient medications on file.     No current facility-administered medications for  this visit.        ALLERGIES:  No Known Allergies    SOCIAL HISTORY:  I have reviewed this patient's social history and updated it with pertinent information if needed.   Bull Mariee  reports that he has never smoked. He has never used smokeless tobacco. He reports current alcohol use. He reports that he does not use drugs.    FAMILY HISTORY:  I have reviewed this patient's family history and updated it with pertinent information if needed.   Family History   Problem Relation Age of Onset     Chronic Obstructive Pulmonary Disease Father      Emphysema Father      Colon Polyps Mother        REVIEW OF SYSTEMS:  I have reviewed this patient's ROS as obtained by clinic staff and updated it with pertinent information if needed.   Skin:  Negative     Eyes:  Positive for contacts  ENT:  Positive for hearing loss  Respiratory:  Negative    Cardiovascular:  Negative    Gastroenterology: Negative    Genitourinary:  Negative    Musculoskeletal:  Negative    Neurologic:  Negative    Psychiatric:  Negative    Heme/Lymph/Imm:  Negative    Endocrine:  Negative        PHYSICAL EXAM:      BP: 137/79 Pulse: 61     SpO2: 97 %      Vital Signs with Ranges  Pulse:  [61] 61  BP: (137)/(79) 137/79  SpO2:  [97 %] 97 %  212 lbs 6.4 oz    Constitutional:  Awake, alert, no acute distress  Eyes: EOMI, sclera non-icteric  ENT: Trachea midline  Respiratory: Normal respiratory effort, CTAB  Cardiovascular: RRR, no m/r/g.  JVP < 7 cm H2O.  No LE edema.  Normal carotid upstrokes, no carotid bruits.  GI:  Nondistended, nontender.  Skin: Dry, no significant rash on exposed skin  Musculoskeletal:  Strength 5/5 in upper and lower extremities  Psychiatric: Appropriate affect      DATA:    Labs: I have personally reviewed the pertinent cardiac labs.    EKG (8/10/2020): Sinus bradycardia at 56 bpm, single PAC, otherwise normal    Coronary calcium score (7/22/2020):  CORONARY ARTERY CALCIUM SCORES:      Left main coronary artery: 76.56  Left anterior  descending coronary artery: 267.90  Circumflex coronary artery: 11.82  Right coronary artery: 53.08     TOTAL CALCIUM SCORE: 405.36    Echocardiogram: None    Angiogram: None        ASSESSMENT AND RECOMMENDATIONS:  Patient is a very pleasant 65-year-old male with past medical history of mild, diet-controlled hyperlipidemia, who presents as a new patient to discuss his elevated coronary calcium score.  We spent extensive time together discussing the specifics of coronary artery calcium testing, its role in cardiac risk prediction, his particular estimated 10-year cardiac risk, and the recommended methods of lowering his risk for adverse cardiac events.  In particular, we used the VALVERDE risk calculator, which incorporates coronary artery calcium score to estimate his ten-year cardiac risk at approximately 12%.  We discussed appropriate diet and lifestyle changes, though he currently does quite well with this.  I recommended that he begin taking a high potency statin (atorvastatin or rosuvastatin), as well as a baby aspirin, and that with this he could expect to cut his ten-year risk approximately in half.  Mr. Mariee felt strongly that this level of absolute risk reduction was not significant and that this was not worth taking medication in order to achieve this.  At the patient's urging, we also discussed other potential strategies for reducing cholesterol outside of statins, including diet, ezetimibe, and PCSK9 inhibitors, though I strongly urged him that these were inadequate replacements for statin.  I tried to get an understanding of what potential drawbacks to the medication, particularly the statin, or causing him to hesitate, but he is not able to name a particular medication concern, rather it was the feeling that the benefit was inadequate.  Ultimately,  did not feel ready to start on medication at this time.  Instead, we will provide him with educational materials regarding lipid management,  coronary disease, and statins.  He will review this, and discuss with a pharmacist he knows as well.  For now, we will not plan on any routine follow-up appointments, but if Mr. Mariee would like to discuss this issue further, or would be interested in starting on an aspirin or statin, we would be happy to see him back in clinic to begin this process.      I spent a total of 60 minutes face-to-face with Bull Mariee  during today s visit. Over 50% of this time was spent counseling the patient and/or coordinating care regarding coronary calcium scoring and hyperlipidemia.        Rene Valadez MD  Interventional Cardiology  September 23, 2020      Thank you for allowing me to participate in the care of your patient.    Sincerely,     Rene Valadez MD     [facility]

## 2020-09-23 NOTE — PATIENT INSTRUCTIONS
"                              && && && && && &&                                         Official reprint from [employer]    www.[email or web address].com  2020 UpToDate, Inc. and/or its affiliates. All Rights Reserved.     The content on the [employer] website is not intended nor recommended as a substitute for medical advice, diagnosis, or treatment. Always seek the advice of your own physician or other qualified health care professional regarding any medical questions or conditions. The use of [employer] content is governed by the [employer] Terms of Use.  2020 UpToDate, Inc. All rights reserved.   Patient education: High cholesterol and lipids (Beyond the Basics)   Author:  Robert Rojas MD  Section :  Fox Juares MD  [place] :  Janneth Hayden MD  All topics are updated as new evidence becomes available and our peer review process is complete.   Literature review current through: Aug 2020.  This topic last updated: Sep 09, 2019.   INTRODUCTIONHigh levels of lipids (fats) in the blood, including cholesterol and triglycerides, is also called \"hyperlipidemia.\" Hyperlipidemia can significantly increase a person's risk of heart attacks, strokes, and other serious problems. To lower these risks, doctors often recommend that people with hyperlipidemia try to lower their cholesterol levels through a combination of dietary changes, exercise, and medication. Most cholesterol-lowering therapies are aimed at reducing low-density lipoprotein (LDL) or \"bad\" cholesterol. High levels of LDL can cause atherosclerosis (buildup of fatty deposits in the blood vessels), which is the major cause of cardiovascular events (heart attacks, strokes, and lower extremity or peripheral artery disease).  This article will discuss the relationship between hyperlipidemia and cardiovascular disease, the different types of lipids, and expert recommendations for lipid screening. Treatment options for high cholesterol are discussed separately. (See " "\"Patient education: High cholesterol and lipid treatment options (Beyond the Basics)\".)  HYPERLIPIDEMIA AND CARDIOVASCULAR DISEASEHyperlipidemia can significantly increase a person's risk of developing cardiovascular disease, including disease of blood vessels supplying the heart (coronary artery disease), brain (cerebrovascular disease), and limbs (peripheral artery disease). These conditions happen when the blood vessels get clogged with fatty deposits, restricting blood flow. When this happens, it can lead to heart attacks, strokes, and other serious problems such as narrowing of the arteries that deliver blood to most organs.  Other risk factors for cardiovascular disease -- In addition to hyperlipidemia, there are a number of other factors that increase a person's risk of cardiovascular disease:  ?Diabetes mellitus (see \"Patient education: Type 1 diabetes: Overview (Beyond the Basics)\" and \"Patient education: Type 2 diabetes: Overview (Beyond the Basics)\")  ?High blood pressure (hypertension) (see \"Patient education: High blood pressure in adults (Beyond the Basics)\")  ?Chronic kidney disease (see \"Patient education: Chronic kidney disease (Beyond the Basics)\")  ?Cigarette smoking  ?Having a parent or sibling who developed cardiovascular disease at a young age (<55 years for men or <65 years for women)  Regardless of whether any of these factors are present, a person's risk for developing cardiovascular disease increases with age. Men have a higher risk than women at any age.  Calculating your risk of cardiovascular disease -- There are various online calculators that allow you to input information about yourself in order to estimate your risk of developing cardiovascular disease. Different calculators can give different scores depending on the variables they use in calculating a person's risk. Examples include:  ?Proctor 2008 data for men (calculator 1)  ?Proctor 2008 data for women (calculator " "2)  ?American College of Cardiology/American Heart Association 2013 data for men and women (calculator 3).  Your health care provider can help you understand how to use the available calculators to better understand your risk and interpret the results.  TYPES OF LIPIDSThe term \"lipids\" includes cholesterol and triglycerides, although there are other types of lipids, too. Standard lipid blood tests include a measurement of total cholesterol, low-density lipoprotein (LDL) and high-density lipoprotein (HDL) cholesterol, and triglycerides.  Total cholesterol -- A high total cholesterol level can increase your risk of cardiovascular disease. However, decisions about when to treat high cholesterol are usually based upon the level of LDL or HDL cholesterol rather than the level of total cholesterol (see 'LDL cholesterol' below and 'HDL cholesterol' below). In general:  ?A total cholesterol level of less than 200 mg/dL (5.17 mmol/L) is normal.  ?A total cholesterol level of 200 to 239 mg/dL (5.17 to 6.18 mmol/L) is borderline high.  ?A total cholesterol level of 240 mg/dL (6.21 mmol/L) or greater is high.  The total cholesterol level can be measured any time of day. It is not necessary to fast (ie, avoid eating) before testing.  LDL cholesterol -- This is sometimes called \"bad\" cholesterol, as high LDL levels raise your risk of cardiovascular disease. Some health care providers make decisions about how to treat hyperlipidemia based on the LDL cholesterol level. Your goal LDL cholesterol depends on your overall risk for a cardiovascular event (heart attack or stroke). Several factors affect your personal risk, including whether you have a history of cardiovascular disease and your risk of developing cardiovascular disease in the future (based on your age, sex, and other major risk factors) (see 'Calculating your risk of cardiovascular disease' above). People at higher risk are often given a lower LDL cholesterol goal.  If " "your health care provider plans to measure your LDL cholesterol level, he or she may ask you to fast (avoid eating) for nine hours or longer in order to obtain an accurate result. A fasting test is more important if you have elevated triglycerides (>200 mg/dL) (see 'Triglycerides' below) or when your health care provider plans to measure your fasting blood sugar (glucose). However, in many cases, your LDL cholesterol can be measured even after you have eaten recently.  HDL cholesterol -- Not all cholesterol is bad. High levels of HDL (\"good\") cholesterol actually lower your risk of cardiovascular disease. A level of 60 mg/dL (1.55 mmol/L) or higher is excellent, while levels of HDL cholesterol less than 40 mg/dL (1.03 mmol/L) are considered lower than desirable. There is no treatment that lowers your risk for a cardiovascular event by raising HDL cholesterol.   As with total cholesterol, the HDL cholesterol can be measured with a blood test at any time, regardless of whether you have been fasting.  Non-HDL cholesterol -- \"Non-HDL\" cholesterol includes LDL cholesterol as well as other types of plaque-forming lipids that do not fall into these categories. Non-HDL cholesterol accounts for the cholesterol carried by very low density lipoproteins (VLDL), intermediate density lipoproteins (IDL), and lipoprotein (a). It can be calculated by subtracting HDL cholesterol from total cholesterol. Since total cholesterol and HDL cholesterol can be measured accurately without fasting, so can non-HDL cholesterol. Non-HDL cholesterol is generally considered a better predictor of cardiovascular risk than LDL cholesterol.  An appropriate non-HDL cholesterol goal can be calculated by adding 30 mg/dL (0.78 mmol/L) to your LDL cholesterol goal. As discussed, the LDL cholesterol goal depends on a number of factors. (See 'LDL cholesterol' above.)  Triglycerides -- High triglyceride levels are also associated with an increased risk of " "cardiovascular disease. Triglyceride levels are divided as follows:  ?Normal - Less than 150 mg/dL (1.7 mmol/L)  ?Mildly increased - 150 to 499 mg/dL (1.7 to 5.6 mmol/L)  ?Moderately increased - 500 to 886 mg/dL (5.6 to 10.0 mmol/L)  ?Very high - Greater than 886 mg/dL (10.0 mmol/L)  Triglycerides should be measured after fasting for at least nine hours. Some people with increased triglyceride levels may need treatment with medication.  WHEN SHOULD I START LIPID SCREENING?Many expert groups have guidelines for lipid screening, which typically involves a \"lipid profile\" that includes blood tests to measure cholesterol and triglyceride levels. The guidelines differ in their recommendations about when to start screening, how frequently you should be screened, and when to stop.  Your health care provider can talk with you about your situation and whether and when you should be screened. An initial screening profile is often measured by the pediatrician during childhood, and should be measured again at age 18 years. Below are some commonly used guidelines.  For men:  ?Regular lipid screening should start at age 35 years if there are no other risk factors for cardiovascular disease. (See 'Other risk factors for cardiovascular disease' above.)  ?Screening should start at age 25 to 30 years if there are other risk factors, such as obesity, diabetes, high blood pressure, smoking, or family history of cardiovascular disease at a young age.  For women:  ?Regular lipid screening should start at age 45 years if there are no other risk factors for cardiovascular disease. (See 'Other risk factors for cardiovascular disease' above.)  ?Screening should start at age 30 to 35 years if there are other risk factors, such as obesity, diabetes, high blood pressure, smoking, or family history of cardiovascular disease at a young age.  The optimal time interval between screenings is uncertain. A reasonable approach is to repeat the lipid " "profile every five years for people who are unlikely to be candidates for treatment based on past results, and more frequently (eg, every three years) for people who are near or above the threshold for treatment.  There is no specific recommendation to stop screening at a particular age. However, once a person has had a lipid profile with normal results, it is probably of less value to continue screening beyond the age of 65, as lipid levels are less likely to increase after this point.  HIGH CHOLESTEROL TREATMENTInformation about how to decide on treatment for hyperlipidemia, and the available treatment options, is available separately. (See \"Patient education: High cholesterol and lipid treatment options (Beyond the Basics)\".)  WHERE TO GET MORE INFORMATIONYour health care practitioner is the best source of information for questions and concerns related to your medical problem.  This article will be updated as needed on our web site ([email or web address]). Related topics for patients, as well as selected articles written for health care professionals, are also available. Some of the most relevant are listed below.  Patient level information -- [employer] offers two types of patient education materials.  The Basics -- The Basics patient education pieces answer the four or five key questions a patient might have about a given condition. These articles are best for patients who want a general overview and who prefer short, easy-to-read materials.  Patient education: High cholesterol (The Basics)  Patient education: Atherosclerosis (The Basics)  Patient education: Coronary artery disease (The Basics)  Patient education: Diabetes and diet (The Basics)  Patient education: Nonalcoholic fatty liver disease (The Basics)  Patient education: The ABCs of diabetes (The Basics)  Patient education: Medicines after an ischemic stroke (The Basics)  Patient education: Heart attack recovery (The Basics)  Patient education: " Medicines after a heart attack (The Basics)  Patient education: Recovery after coronary artery bypass graft surgery (CABG) (The Basics)  Patient education: Lowering the risk of having another stroke (The Basics)  Patient education: Coronary artery disease in women (The Basics)  Patient education: Can foods or supplements lower cholesterol? (The Basics)  Beyond the Basics -- Beyond the Basics patient education pieces are longer, more sophisticated, and more detailed. These articles are best for patients who want in-depth information and are comfortable with some medical jargon.  Patient education: High cholesterol and lipid treatment options (Beyond the Basics)  Patient education: Type 1 diabetes: Overview (Beyond the Basics)  Patient education: Type 2 diabetes: Overview (Beyond the Basics)  Patient education: Transient ischemic attack (Beyond the Basics)  Patient education: Stroke symptoms and diagnosis (Beyond the Basics)  Patient education: Peripheral artery disease and claudication (Beyond the Basics)  Patient education: Abdominal aortic aneurysm (Beyond the Basics)  Patient education: High blood pressure in adults (Beyond the Basics)  Professional level information -- Professional level articles are designed to keep doctors and other health professionals up-to-date on the latest medical findings. These articles are thorough, long, and complex, and they contain multiple references to the research on which they are based. Professional level articles are best for people who are comfortable with a lot of medical terminology and who want to read the same materials their doctors are reading.  Hypertriglyceridemia  HDL cholesterol: Clinical aspects of abnormal values  Lipid management with diet or dietary supplements  Low density lipoprotein cholesterol lowering with drugs other than statins and PCSK9 inhibitors  Lipoprotein(a)  Inherited disorders of LDL-cholesterol metabolism other than familial  hypercholesterolemia  Screening for lipid disorders in adults  Secondary causes of dyslipidemia  Statins: Actions, side effects, and administration  Treatment of drug-resistant hypercholesterolemia  Management of elevated low density lipoprotein-cholesterol (LDL-C) in primary prevention of cardiovascular disease  Management of low density lipoprotein cholesterol (LDL-C) in the secondary prevention of cardiovascular disease  The following organizations also provide reliable health information.  ?National Library of Medicine  (www.nlm.nih.gov/medlineplus/healthtopics.html)  ?National Cholesterol Education Program of the National [facility] Lung, [facility] [place] of the NIH  (www.nhlbi.nih.gov/health/health-topics/topics/hbc)  ?American Heart Association  (www.americanheart.org)  ?The Hormone Health Network  (https://www.hormone.org/diseases-and-conditions/hyperlipidemia, available in English and Barbadian)  ?The [place] Heart Study  (https://framinghamheartstudy.org/)  Use of [employer] is subject to the Subscription and License Agreement.   Topic 3440 Version 43.0   Contributor Disclosures  Robert Rojas MDEquity Ownership/Stock Options: [employer] [Pharmacy claims database]. Roberto Carlos/Research/Clinical Trial Support: [employer] [Lipids]; [employer] [Lipids]; Novartis [Lipids]; Regener[employer] [Lipids]. Consultant/Advisory Boards: [employer] [Lipids]; [employer] [Antiinflammatory]; [employer]; [employer] [Lipids]; [employer] [Lipids]. Other Financial Interests: [employer] [Lipids]; Amgen [Lipids]; Pfizer [Lipids]; Regeneron [Lipids].Fox Juares MDNothing to FRANK Hunteronsultant/Advisory Boards (Partner): [employer] [Pharmaceutical management of formulary decision-making].   Contributor disclosures are reviewed for conflicts of interest by the editorial group. When found, these are addressed by vetting through a multi-level review process, and through requirements for references to be  "provided to support the content. Appropriately referenced content is required of all authors and must conform to [facility] standards of evidence.  Conflict of interest policy  Print Options  Print  Back     Text         Contributor Disclosures   Close   [employer] drug information & [employer] are subject to the [employer] License Agreement.                                                                         0 && [email or web address]\"                           && && && && && &&                                         Official reprint from [employer]    www.[email or web address].com  2020 UpToDate, Inc. and/or its affiliates. All Rights Reserved.     Patient education: Coronary artery disease (The Basics)   Written by the doctors and editors at [facility]   What is coronary artery disease?Coronary artery disease is a condition that puts you at risk for heart attack and other forms of heart disease. In people who have coronary artery disease, the arteries that supply blood to the heart get clogged with fatty deposits (figure 1). Other names for this disease are \"coronary heart disease\" or just \"heart disease.\"  What are the symptoms of coronary artery disease?Many people with coronary artery disease have no symptoms. For those who do, the most common symptoms usually happen with exercise. They can include:  ?Pain, pressure, or discomfort in the center of the chest  ?Pain, tingling, or discomfort in other parts of the upper body - This might include the arms, back, neck, jaw, or stomach.  ?Feeling short of breath  What are the symptoms of a heart attack?The first symptom of coronary artery disease can be a heart attack (figure 2). That's why it is so important to know how to spot a heart attack.  The symptoms of a heart attack can include:  ?Pain, pressure, or discomfort in the center of the chest  ?Pain, tingling, or discomfort in other parts of the upper body, including the arms, back, neck, jaw, or " "stomach  ?Shortness of breath  ?Nausea, vomiting, burping, or heartburn  ?Sweating or having cold, clammy skin  ?A racing or uneven heartbeat  ?Feeling dizzy or lightheaded  If these symptoms last more than 10 minutes or they keep coming and going, call for an ambulance (in the [place] and [place], dial [id number]) right away. Do not try to get to the hospital on your own.  As mentioned above, some people with coronary artery disease have chest pain even when they are not having a heart attack. This is most likely to happen when they are walking, going up stairs, or moving around. But if you have chest pain that is new or different than pain you have had before, you should see a doctor right away.  Is there a test for coronary artery disease?Yes. If your doctor or nurse thinks you might have coronary artery disease, he or she might order blood tests and one or more of these tests:  ?An electrocardiogram (\"ECG\") - This test measures the electrical activity in your heart.  ?A stress test - During a stress test, which is also called an exercise test, you might be asked to run or walk on a treadmill while you also have an ECG. Physical activity increases the heart's need for blood. This test helps doctors see if the heart is getting enough blood. If you cannot walk or run, your doctor might do this test by giving you a medicine to make your heart pump faster.  ?An echocardiogram - This test uses sound waves to create an image of your heart as it beats.  ?Cardiac catheterization (also called \"cardiac cath\") - During this test, the doctor puts a thin tube into a blood vessel in your leg or arm. Then he or she moves the tube up to your heart. Next, the doctor puts a dye that shows up on X-ray into the tube. This part of the test is called \"coronary angiography.\" It can show whether any of the arteries in your heart are clogged.  How is coronary artery disease treated?The main treatments for coronary artery disease are:  ?Lifestyle " "changes - Here are some things you can do to reduce your risk of heart attack and death:   Quit smoking, if you smoke.   Eat lots of fruits, vegetables, and foods with a lot of fiber. Avoid foods that have a lot of sugar.   Walk or do some form of physically activity on most days of the week.   Lose weight, if you are overweight.  ?Medicines - The medicines to treat heart disease are very important. Some medicines lower your risk of heart attacks and can help you live longer. But you must take them every day, as directed. Medicines your doctor might prescribe include:   Medicines called statins, which lower cholesterol   Medicines to lower blood pressure   Aspirin or other medicines that help prevent blood clots   Medicines to treat diabetes  People who have chest pain caused by coronary artery disease (called \"angina\") can also get medicines to relive their pain. These medicines might include \"nitrates,\" \"beta blockers,\" and others.  Some people with coronary artery disease can also have:  ?A stent procedure - During this procedure, the doctor puts a thin plastic tube into the blocked artery, and uses a tiny balloon to open the blockage. Then the doctor leaves a tiny mesh tube called a \"stent\" inside the artery to hold it open.  ?Bypass surgery (also known as \"coronary artery bypass grafting\" or CABG) - During bypass surgery, the doctor removes a piece of blood vessel from another part of the body. Then he or she reattaches the blood vessel above and below the area that is clogged. This re-routes blood around the clog and allows it to get to the part of the heart that was not getting blood (figure 3).  If your doctor recommends stenting or bypass surgery, ask these questions:  ?What are the benefits of this procedure for me? Will the procedure help me live longer? Will it reduce my chance of having a heart attack? Will I feel better if I have this procedure?  ?What are the risks of the procedure?  ?What happens if " I don't have this procedure?  More on this topic  Patient education: Medicines after a heart attack (The Basics)  Patient education: Heart attack (The Basics)  Patient education: Heart attack recovery (The Basics)  Patient education: Chest pain (The Basics)  Patient education: High blood pressure in adults (The Basics)  Patient education: High cholesterol (The Basics)  Patient education: Echocardiogram (The Basics)  Patient education: ECG and stress test (The Basics)  Patient education: Nuclear heart testing (The Basics)  Patient education: Cardiac catheterization (The Basics)  Patient education: Coronary artery disease in women (The Basics)  Patient education: Stenting for the heart (The Basics)  Patient education: Treatment choices for angina (chest pain) (The Basics)  Patient education: Can foods or supplements lower cholesterol? (The Basics)  Patient education: Coronary artery bypass graft surgery (The Basics)  Patient education: High cholesterol and lipids (Beyond the Basics)  Patient education: Heart attack (Beyond the Basics)  Patient education: Aspirin in the primary prevention of cardiovascular disease and cancer (Beyond the Basics)  Patient education: Chest pain (Beyond the Basics)  Patient education: Medications for angina (Beyond the Basics)  Patient education: Angina treatment -- medical versus interventional therapy (Beyond the Basics)  Patient education: Stenting for the heart (Beyond the Basics)  Patient education: Coronary artery bypass graft surgery (Beyond the Basics)  Patient education: Recovery after coronary artery bypass graft surgery (CABG) (Beyond the Basics)  All topics are updated as new evidence becomes available and our peer review process is complete.   This topic retrieved from "[employer]" on: Sep 23, 2020.   The content on the "[employer]" website is not intended nor recommended as a substitute for medical advice, diagnosis, or treatment. Always seek the advice of your own physician or other  qualified health care professional regarding any medical questions or conditions. The use of [employer] content is governed by the [employer] Terms of Use.  2020 UpToDate, Inc. All rights reserved.   Topic 67769 Version 22.0         Print Options  Print  Back        Text       Graphics     Close   [employer] drug information & [employer] are subject to the [employer] License Agreement.

## 2020-09-23 NOTE — LETTER
9/23/2020    Luis Alberto Villavicencio MD  [address] [facility] [address]  [facility] [address]    RE: Bull Mariee       Dear Colleague,    I had the pleasure of seeing Bull Mariee in the [facility].    CARDIOLOGY CLINIC CONSULTATION    REASON FOR CONSULT: Abnormal coronary calcium score    PRIMARY CARE PHYSICIAN:  Luis Alberto Villavicencio      HISTORY OF PRESENT ILLNESS:  Patient is a very pleasant 65-year-old male with past medical history of mild, diet-controlled hyperlipidemia, who presents as a new patient to discuss his elevated coronary calcium score.  Patient states that his normal primary care doctor has been out recently on the  tour of duty, and he saw one of his normal primary's partners.  This physician suggested ordering a coronary calcium score to help decide on whether or not he should be started on a statin.  The coronary calcium score came back as 405, which is markedly elevated.  The patient wished to discuss these results with a cardiologist, which is why he presents today.  We did discuss any cardiac symptoms that he may be having, but he denies any chest pain, shortness of breath, palpitations, lower extremity swelling, or other cardiac complaints.  He exercises regularly, biking 100 to 150 miles per week, without any recent drop in his exertional capacity.  He is not interested currently in taking a statin medication, but is interested in learning more about the pros and cons of this.    PAST MEDICAL HISTORY:  I have reviewed this patient's past medical history and updated it with pertinent information if needed.  Past Medical History:   Diagnosis Date     Dyslipidemia      ED (erectile dysfunction)      Elevated coronary artery calcium score     CT calcium score 7/22/2020: total [id number]=817 / 80th percentile     Impaired fasting glucose      Inguinal hernia        MEDICATIONS:  No current outpatient medications on file.     No current facility-administered medications for  this visit.        ALLERGIES:  No Known Allergies    SOCIAL HISTORY:  I have reviewed this patient's social history and updated it with pertinent information if needed.   Bull Mariee  reports that he has never smoked. He has never used smokeless tobacco. He reports current alcohol use. He reports that he does not use drugs.    FAMILY HISTORY:  I have reviewed this patient's family history and updated it with pertinent information if needed.   Family History   Problem Relation Age of Onset     Chronic Obstructive Pulmonary Disease Father      Emphysema Father      Colon Polyps Mother        REVIEW OF SYSTEMS:  I have reviewed this patient's ROS as obtained by clinic staff and updated it with pertinent information if needed.   Skin:  Negative     Eyes:  Positive for contacts  ENT:  Positive for hearing loss  Respiratory:  Negative    Cardiovascular:  Negative    Gastroenterology: Negative    Genitourinary:  Negative    Musculoskeletal:  Negative    Neurologic:  Negative    Psychiatric:  Negative    Heme/Lymph/Imm:  Negative    Endocrine:  Negative        PHYSICAL EXAM:      BP: 137/79 Pulse: 61     SpO2: 97 %      Vital Signs with Ranges  Pulse:  [61] 61  BP: (137)/(79) 137/79  SpO2:  [97 %] 97 %  212 lbs 6.4 oz    Constitutional:  Awake, alert, no acute distress  Eyes: EOMI, sclera non-icteric  ENT: Trachea midline  Respiratory: Normal respiratory effort, CTAB  Cardiovascular: RRR, no m/r/g.  JVP < 7 cm H2O.  No LE edema.  Normal carotid upstrokes, no carotid bruits.  GI:  Nondistended, nontender.  Skin: Dry, no significant rash on exposed skin  Musculoskeletal:  Strength 5/5 in upper and lower extremities  Psychiatric: Appropriate affect      DATA:    Labs: I have personally reviewed the pertinent cardiac labs.    EKG (8/10/2020): Sinus bradycardia at 56 bpm, single PAC, otherwise normal    Coronary calcium score (7/22/2020):  CORONARY ARTERY CALCIUM SCORES:      Left main coronary artery: 76.56  Left anterior  descending coronary artery: 267.90  Circumflex coronary artery: 11.82  Right coronary artery: 53.08     TOTAL CALCIUM SCORE: 405.36    Echocardiogram: None    Angiogram: None        ASSESSMENT AND RECOMMENDATIONS:  Patient is a very pleasant 65-year-old male with past medical history of mild, diet-controlled hyperlipidemia, who presents as a new patient to discuss his elevated coronary calcium score.  We spent extensive time together discussing the specifics of coronary artery calcium testing, its role in cardiac risk prediction, his particular estimated 10-year cardiac risk, and the recommended methods of lowering his risk for adverse cardiac events.  In particular, we used the VALVERDE risk calculator, which incorporates coronary artery calcium score to estimate his ten-year cardiac risk at approximately 12%.  We discussed appropriate diet and lifestyle changes, though he currently does quite well with this.  I recommended that he begin taking a high potency statin (atorvastatin or rosuvastatin), as well as a baby aspirin, and that with this he could expect to cut his ten-year risk approximately in half.  Mr. Mariee felt strongly that this level of absolute risk reduction was not significant and that this was not worth taking medication in order to achieve this.  At the patient's urging, we also discussed other potential strategies for reducing cholesterol outside of statins, including diet, ezetimibe, and PCSK9 inhibitors, though I strongly urged him that these were inadequate replacements for statin.  I tried to get an understanding of what potential drawbacks to the medication, particularly the statin, or causing him to hesitate, but he is not able to name a particular medication concern, rather it was the feeling that the benefit was inadequate.  Ultimately,  did not feel ready to start on medication at this time.  Instead, we will provide him with educational materials regarding lipid management,  coronary disease, and statins.  He will review this, and discuss with a pharmacist he knows as well.  For now, we will not plan on any routine follow-up appointments, but if Mr. Mariee would like to discuss this issue further, or would be interested in starting on an aspirin or statin, we would be happy to see him back in clinic to begin this process.      I spent a total of 60 minutes face-to-face with Bull Mariee  during today s visit. Over 50% of this time was spent counseling the patient and/or coordinating care regarding coronary calcium scoring and hyperlipidemia.        Rene Valaedz MD  Interventional Cardiology  September 23, 2020          Thank you for allowing me to participate in the care of your patient.      Sincerely,     Rene Valadez MD     [facility]    cc:   No referring provider defined for this encounter.

## 2020-09-23 NOTE — PROGRESS NOTES
CARDIOLOGY CLINIC CONSULTATION    REASON FOR CONSULT: Abnormal coronary calcium score    PRIMARY CARE PHYSICIAN:  Luis Alberto Villavicencio      HISTORY OF PRESENT ILLNESS:  Patient is a very pleasant 65-year-old male with past medical history of mild, diet-controlled hyperlipidemia, who presents as a new patient to discuss his elevated coronary calcium score.  Patient states that his normal primary care doctor has been out recently on the  tour of duty, and he saw one of his normal primary's partners.  This physician suggested ordering a coronary calcium score to help decide on whether or not he should be started on a statin.  The coronary calcium score came back as 405, which is markedly elevated.  The patient wished to discuss these results with a cardiologist, which is why he presents today.  We did discuss any cardiac symptoms that he may be having, but he denies any chest pain, shortness of breath, palpitations, lower extremity swelling, or other cardiac complaints.  He exercises regularly, biking 100 to 150 miles per week, without any recent drop in his exertional capacity.  He is not interested currently in taking a statin medication, but is interested in learning more about the pros and cons of this.    PAST MEDICAL HISTORY:  I have reviewed this patient's past medical history and updated it with pertinent information if needed.  Past Medical History:   Diagnosis Date     Dyslipidemia      ED (erectile dysfunction)      Elevated coronary artery calcium score     CT calcium score 7/22/2020: total [id number]=597 / 80th percentile     Impaired fasting glucose      Inguinal hernia        MEDICATIONS:  No current outpatient medications on file.     No current facility-administered medications for this visit.        ALLERGIES:  No Known Allergies    SOCIAL HISTORY:  I have reviewed this patient's social history and updated it with pertinent information if needed.   Bull Mariee  reports that he has never smoked. He  has never used smokeless tobacco. He reports current alcohol use. He reports that he does not use drugs.    FAMILY HISTORY:  I have reviewed this patient's family history and updated it with pertinent information if needed.   Family History   Problem Relation Age of Onset     Chronic Obstructive Pulmonary Disease Father      Emphysema Father      Colon Polyps Mother        REVIEW OF SYSTEMS:  I have reviewed this patient's ROS as obtained by clinic staff and updated it with pertinent information if needed.   Skin:  Negative     Eyes:  Positive for contacts  ENT:  Positive for hearing loss  Respiratory:  Negative    Cardiovascular:  Negative    Gastroenterology: Negative    Genitourinary:  Negative    Musculoskeletal:  Negative    Neurologic:  Negative    Psychiatric:  Negative    Heme/Lymph/Imm:  Negative    Endocrine:  Negative        PHYSICAL EXAM:      BP: 137/79 Pulse: 61     SpO2: 97 %      Vital Signs with Ranges  Pulse:  [61] 61  BP: (137)/(79) 137/79  SpO2:  [97 %] 97 %  212 lbs 6.4 oz    Constitutional:  Awake, alert, no acute distress  Eyes: EOMI, sclera non-icteric  ENT: Trachea midline  Respiratory: Normal respiratory effort, CTAB  Cardiovascular: RRR, no m/r/g.  JVP < 7 cm H2O.  No LE edema.  Normal carotid upstrokes, no carotid bruits.  GI:  Nondistended, nontender.  Skin: Dry, no significant rash on exposed skin  Musculoskeletal:  Strength 5/5 in upper and lower extremities  Psychiatric: Appropriate affect      DATA:    Labs: I have personally reviewed the pertinent cardiac labs.    EKG (8/10/2020): Sinus bradycardia at 56 bpm, single PAC, otherwise normal    Coronary calcium score (7/22/2020):  CORONARY ARTERY CALCIUM SCORES:      Left main coronary artery: 76.56  Left anterior descending coronary artery: 267.90  Circumflex coronary artery: 11.82  Right coronary artery: 53.08     TOTAL CALCIUM SCORE: 405.36    Echocardiogram: None    Angiogram: None        ASSESSMENT AND RECOMMENDATIONS:  Patient is  a very pleasant 65-year-old male with past medical history of mild, diet-controlled hyperlipidemia, who presents as a new patient to discuss his elevated coronary calcium score.  We spent extensive time together discussing the specifics of coronary artery calcium testing, its role in cardiac risk prediction, his particular estimated 10-year cardiac risk, and the recommended methods of lowering his risk for adverse cardiac events.  In particular, we used the VALVERDE risk calculator, which incorporates coronary artery calcium score to estimate his ten-year cardiac risk at approximately 12%.  We discussed appropriate diet and lifestyle changes, though he currently does quite well with this.  I recommended that he begin taking a high potency statin (atorvastatin or rosuvastatin), as well as a baby aspirin, and that with this he could expect to cut his ten-year risk approximately in half.  Mr. Mariee felt strongly that this level of absolute risk reduction was not significant and that this was not worth taking medication in order to achieve this.  At the patient's urging, we also discussed other potential strategies for reducing cholesterol outside of statins, including diet, ezetimibe, and PCSK9 inhibitors, though I strongly urged him that these were inadequate replacements for statin.  I tried to get an understanding of what potential drawbacks to the medication, particularly the statin, or causing him to hesitate, but he is not able to name a particular medication concern, rather it was the feeling that the benefit was inadequate.  Ultimately,  did not feel ready to start on medication at this time.  Instead, we will provide him with educational materials regarding lipid management, coronary disease, and statins.  He will review this, and discuss with a pharmacist he knows as well.  For now, we will not plan on any routine follow-up appointments, but if Mr. Mariee would like to discuss this issue further, or  would be interested in starting on an aspirin or statin, we would be happy to see him back in clinic to begin this process.      I spent a total of 60 minutes face-to-face with Bull Mariee  during today s visit. Over 50% of this time was spent counseling the patient and/or coordinating care regarding coronary calcium scoring and hyperlipidemia.        Rene Valadez MD  Interventional Cardiology  September 23, 2020

## 2021-01-15 ENCOUNTER — HEALTH MAINTENANCE LETTER (OUTPATIENT)
Age: 66
End: 2021-01-15

## 2021-03-21 ENCOUNTER — HEALTH MAINTENANCE LETTER (OUTPATIENT)
Age: 66
End: 2021-03-21

## 2021-09-05 ENCOUNTER — HEALTH MAINTENANCE LETTER (OUTPATIENT)
Age: 66
End: 2021-09-05

## 2022-04-17 ENCOUNTER — HEALTH MAINTENANCE LETTER (OUTPATIENT)
Age: 67
End: 2022-04-17

## 2022-10-23 ENCOUNTER — HEALTH MAINTENANCE LETTER (OUTPATIENT)
Age: 67
End: 2022-10-23

## 2023-06-01 ENCOUNTER — HEALTH MAINTENANCE LETTER (OUTPATIENT)
Age: 68
End: 2023-06-01

## (undated) DEVICE — GLOVE PROTEXIS W/NEU-THERA 7.5  2D73TE75

## (undated) DEVICE — LINEN TOWEL PACK X5 5464

## (undated) DEVICE — ENDO SCOPE WARMER LF TM500

## (undated) DEVICE — SU VICRYL 0 ENDOLOOP 18" EJ10

## (undated) DEVICE — SU VICRYL 0 UR-6 27" J603H

## (undated) DEVICE — SU MONOCRYL 4-0 PS-2 18" UND Y496G

## (undated) DEVICE — PACK LAP CHOLE SLC15LCFSD

## (undated) DEVICE — PREP CHLORAPREP 26ML TINTED ORANGE  260815

## (undated) DEVICE — DISSECTOR BALLOON SPACEMAKER PRO BTT & OVAL SMBTTOVLX

## (undated) DEVICE — SOL NACL 0.9% IRRIG 1000ML BOTTLE 2F7124

## (undated) DEVICE — SOL WATER IRRIG 1000ML BOTTLE 2F7114

## (undated) DEVICE — ESU GROUND PAD UNIVERSAL W/O CORD

## (undated) DEVICE — SUCTION CANISTER MEDIVAC LINER 3000ML W/LID 65651-530

## (undated) DEVICE — GLOVE PROTEXIS BLUE W/NEU-THERA 7.5  2D73EB75

## (undated) RX ORDER — FENTANYL CITRATE 50 UG/ML
INJECTION, SOLUTION INTRAMUSCULAR; INTRAVENOUS
Status: DISPENSED
Start: 2020-08-10

## (undated) RX ORDER — PROPOFOL 10 MG/ML
INJECTION, EMULSION INTRAVENOUS
Status: DISPENSED
Start: 2020-08-10

## (undated) RX ORDER — HYDROMORPHONE HYDROCHLORIDE 1 MG/ML
INJECTION, SOLUTION INTRAMUSCULAR; INTRAVENOUS; SUBCUTANEOUS
Status: DISPENSED
Start: 2020-08-10

## (undated) RX ORDER — CEFAZOLIN SODIUM 2 G/100ML
INJECTION, SOLUTION INTRAVENOUS
Status: DISPENSED
Start: 2020-08-10

## (undated) RX ORDER — HYDROCODONE BITARTRATE AND ACETAMINOPHEN 5; 325 MG/1; MG/1
TABLET ORAL
Status: DISPENSED
Start: 2020-08-10

## (undated) RX ORDER — ONDANSETRON 2 MG/ML
INJECTION INTRAMUSCULAR; INTRAVENOUS
Status: DISPENSED
Start: 2020-08-10

## (undated) RX ORDER — KETOROLAC TROMETHAMINE 30 MG/ML
INJECTION, SOLUTION INTRAMUSCULAR; INTRAVENOUS
Status: DISPENSED
Start: 2020-08-10

## (undated) RX ORDER — LIDOCAINE HYDROCHLORIDE 20 MG/ML
INJECTION, SOLUTION EPIDURAL; INFILTRATION; INTRACAUDAL; PERINEURAL
Status: DISPENSED
Start: 2020-08-10

## (undated) RX ORDER — DEXAMETHASONE SODIUM PHOSPHATE 4 MG/ML
INJECTION, SOLUTION INTRA-ARTICULAR; INTRALESIONAL; INTRAMUSCULAR; INTRAVENOUS; SOFT TISSUE
Status: DISPENSED
Start: 2020-08-10